# Patient Record
Sex: MALE | Race: WHITE | NOT HISPANIC OR LATINO | URBAN - METROPOLITAN AREA
[De-identification: names, ages, dates, MRNs, and addresses within clinical notes are randomized per-mention and may not be internally consistent; named-entity substitution may affect disease eponyms.]

---

## 2022-01-22 ENCOUNTER — INPATIENT (INPATIENT)
Facility: HOSPITAL | Age: 37
LOS: 0 days | Discharge: HOME | DRG: 897 | End: 2022-01-23
Attending: INTERNAL MEDICINE | Admitting: INTERNAL MEDICINE
Payer: COMMERCIAL

## 2022-01-22 VITALS
HEART RATE: 139 BPM | WEIGHT: 199.96 LBS | RESPIRATION RATE: 18 BRPM | SYSTOLIC BLOOD PRESSURE: 177 MMHG | TEMPERATURE: 98 F | OXYGEN SATURATION: 98 % | DIASTOLIC BLOOD PRESSURE: 125 MMHG | HEIGHT: 73 IN

## 2022-01-22 DIAGNOSIS — R07.89 OTHER CHEST PAIN: ICD-10-CM

## 2022-01-22 DIAGNOSIS — I10 ESSENTIAL (PRIMARY) HYPERTENSION: ICD-10-CM

## 2022-01-22 DIAGNOSIS — F19.90 OTHER PSYCHOACTIVE SUBSTANCE USE, UNSPECIFIED, UNCOMPLICATED: ICD-10-CM

## 2022-01-22 DIAGNOSIS — R00.2 PALPITATIONS: ICD-10-CM

## 2022-01-22 LAB
ALBUMIN SERPL ELPH-MCNC: 4.4 G/DL — SIGNIFICANT CHANGE UP (ref 3.3–5)
ALBUMIN SERPL ELPH-MCNC: 5 G/DL — SIGNIFICANT CHANGE UP (ref 3.3–5)
ALP SERPL-CCNC: 29 U/L — LOW (ref 40–120)
ALP SERPL-CCNC: 32 U/L — LOW (ref 40–120)
ALT FLD-CCNC: 45 U/L — SIGNIFICANT CHANGE UP (ref 10–45)
ALT FLD-CCNC: 48 U/L — HIGH (ref 10–45)
ANION GAP SERPL CALC-SCNC: 10 MMOL/L — SIGNIFICANT CHANGE UP (ref 5–17)
ANION GAP SERPL CALC-SCNC: 15 MMOL/L — SIGNIFICANT CHANGE UP (ref 5–17)
APTT BLD: 32.7 SEC — SIGNIFICANT CHANGE UP (ref 27.5–35.5)
AST SERPL-CCNC: 24 U/L — SIGNIFICANT CHANGE UP (ref 10–40)
AST SERPL-CCNC: 29 U/L — SIGNIFICANT CHANGE UP (ref 10–40)
BASOPHILS # BLD AUTO: 0.04 K/UL — SIGNIFICANT CHANGE UP (ref 0–0.2)
BASOPHILS NFR BLD AUTO: 0.5 % — SIGNIFICANT CHANGE UP (ref 0–2)
BILIRUB SERPL-MCNC: 0.2 MG/DL — SIGNIFICANT CHANGE UP (ref 0.2–1.2)
BILIRUB SERPL-MCNC: 0.2 MG/DL — SIGNIFICANT CHANGE UP (ref 0.2–1.2)
BLD GP AB SCN SERPL QL: NEGATIVE — SIGNIFICANT CHANGE UP
BUN SERPL-MCNC: 16 MG/DL — SIGNIFICANT CHANGE UP (ref 7–23)
BUN SERPL-MCNC: 16 MG/DL — SIGNIFICANT CHANGE UP (ref 7–23)
CALCIUM SERPL-MCNC: 8.8 MG/DL — SIGNIFICANT CHANGE UP (ref 8.4–10.5)
CALCIUM SERPL-MCNC: 9.1 MG/DL — SIGNIFICANT CHANGE UP (ref 8.4–10.5)
CHLORIDE SERPL-SCNC: 103 MMOL/L — SIGNIFICANT CHANGE UP (ref 96–108)
CHLORIDE SERPL-SCNC: 104 MMOL/L — SIGNIFICANT CHANGE UP (ref 96–108)
CK MB CFR SERPL CALC: 1.7 NG/ML — SIGNIFICANT CHANGE UP (ref 0–6.7)
CK SERPL-CCNC: 164 U/L — SIGNIFICANT CHANGE UP (ref 30–200)
CO2 SERPL-SCNC: 24 MMOL/L — SIGNIFICANT CHANGE UP (ref 22–31)
CO2 SERPL-SCNC: 27 MMOL/L — SIGNIFICANT CHANGE UP (ref 22–31)
CREAT SERPL-MCNC: 1.06 MG/DL — SIGNIFICANT CHANGE UP (ref 0.5–1.3)
CREAT SERPL-MCNC: 1.08 MG/DL — SIGNIFICANT CHANGE UP (ref 0.5–1.3)
EOSINOPHIL # BLD AUTO: 0.14 K/UL — SIGNIFICANT CHANGE UP (ref 0–0.5)
EOSINOPHIL NFR BLD AUTO: 1.9 % — SIGNIFICANT CHANGE UP (ref 0–6)
GLUCOSE SERPL-MCNC: 171 MG/DL — HIGH (ref 70–99)
GLUCOSE SERPL-MCNC: 182 MG/DL — HIGH (ref 70–99)
HCT VFR BLD CALC: 44.6 % — SIGNIFICANT CHANGE UP (ref 39–50)
HGB BLD-MCNC: 15.6 G/DL — SIGNIFICANT CHANGE UP (ref 13–17)
IMM GRANULOCYTES NFR BLD AUTO: 0.7 % — SIGNIFICANT CHANGE UP (ref 0–1.5)
INR BLD: 0.94 — SIGNIFICANT CHANGE UP (ref 0.88–1.16)
LYMPHOCYTES # BLD AUTO: 3.9 K/UL — HIGH (ref 1–3.3)
LYMPHOCYTES # BLD AUTO: 51.7 % — HIGH (ref 13–44)
MCHC RBC-ENTMCNC: 30.5 PG — SIGNIFICANT CHANGE UP (ref 27–34)
MCHC RBC-ENTMCNC: 35 GM/DL — SIGNIFICANT CHANGE UP (ref 32–36)
MCV RBC AUTO: 87.3 FL — SIGNIFICANT CHANGE UP (ref 80–100)
MONOCYTES # BLD AUTO: 0.55 K/UL — SIGNIFICANT CHANGE UP (ref 0–0.9)
MONOCYTES NFR BLD AUTO: 7.3 % — SIGNIFICANT CHANGE UP (ref 2–14)
NEUTROPHILS # BLD AUTO: 2.87 K/UL — SIGNIFICANT CHANGE UP (ref 1.8–7.4)
NEUTROPHILS NFR BLD AUTO: 37.9 % — LOW (ref 43–77)
NRBC # BLD: 0 /100 WBCS — SIGNIFICANT CHANGE UP (ref 0–0)
NT-PROBNP SERPL-SCNC: 10 PG/ML — SIGNIFICANT CHANGE UP (ref 0–300)
PLATELET # BLD AUTO: 254 K/UL — SIGNIFICANT CHANGE UP (ref 150–400)
POTASSIUM SERPL-MCNC: 4.3 MMOL/L — SIGNIFICANT CHANGE UP (ref 3.5–5.3)
POTASSIUM SERPL-MCNC: SIGNIFICANT CHANGE UP (ref 3.5–5.3)
POTASSIUM SERPL-SCNC: 4.3 MMOL/L — SIGNIFICANT CHANGE UP (ref 3.5–5.3)
POTASSIUM SERPL-SCNC: SIGNIFICANT CHANGE UP (ref 3.5–5.3)
PROT SERPL-MCNC: 6.4 G/DL — SIGNIFICANT CHANGE UP (ref 6–8.3)
PROT SERPL-MCNC: 7.4 G/DL — SIGNIFICANT CHANGE UP (ref 6–8.3)
PROTHROM AB SERPL-ACNC: 11.3 SEC — SIGNIFICANT CHANGE UP (ref 10.6–13.6)
RBC # BLD: 5.11 M/UL — SIGNIFICANT CHANGE UP (ref 4.2–5.8)
RBC # FLD: 12.5 % — SIGNIFICANT CHANGE UP (ref 10.3–14.5)
RH IG SCN BLD-IMP: NEGATIVE — SIGNIFICANT CHANGE UP
SARS-COV-2 RNA SPEC QL NAA+PROBE: NEGATIVE — SIGNIFICANT CHANGE UP
SODIUM SERPL-SCNC: 141 MMOL/L — SIGNIFICANT CHANGE UP (ref 135–145)
SODIUM SERPL-SCNC: 142 MMOL/L — SIGNIFICANT CHANGE UP (ref 135–145)
TROPONIN T SERPL-MCNC: 0.01 NG/ML — SIGNIFICANT CHANGE UP (ref 0–0.01)
WBC # BLD: 7.55 K/UL — SIGNIFICANT CHANGE UP (ref 3.8–10.5)
WBC # FLD AUTO: 7.55 K/UL — SIGNIFICANT CHANGE UP (ref 3.8–10.5)

## 2022-01-22 PROCEDURE — 93010 ELECTROCARDIOGRAM REPORT: CPT

## 2022-01-22 PROCEDURE — 71275 CT ANGIOGRAPHY CHEST: CPT | Mod: 26,MC

## 2022-01-22 PROCEDURE — 99285 EMERGENCY DEPT VISIT HI MDM: CPT

## 2022-01-22 RX ORDER — FLUTICASONE PROPIONATE 50 MCG
1 SPRAY, SUSPENSION NASAL
Qty: 0 | Refills: 0 | DISCHARGE

## 2022-01-22 RX ORDER — ENOXAPARIN SODIUM 100 MG/ML
40 INJECTION SUBCUTANEOUS EVERY 24 HOURS
Refills: 0 | Status: DISCONTINUED | OUTPATIENT
Start: 2022-01-23 | End: 2022-01-23

## 2022-01-22 RX ORDER — METOPROLOL TARTRATE 50 MG
5 TABLET ORAL ONCE
Refills: 0 | Status: COMPLETED | OUTPATIENT
Start: 2022-01-22 | End: 2022-01-22

## 2022-01-22 RX ORDER — FLUTICASONE PROPIONATE 50 MCG
1 SPRAY, SUSPENSION NASAL
Refills: 0 | Status: DISCONTINUED | OUTPATIENT
Start: 2022-01-22 | End: 2022-01-23

## 2022-01-22 RX ORDER — ASPIRIN/CALCIUM CARB/MAGNESIUM 324 MG
81 TABLET ORAL DAILY
Refills: 0 | Status: DISCONTINUED | OUTPATIENT
Start: 2022-01-22 | End: 2022-01-23

## 2022-01-22 RX ORDER — SODIUM CHLORIDE 9 MG/ML
1000 INJECTION INTRAMUSCULAR; INTRAVENOUS; SUBCUTANEOUS ONCE
Refills: 0 | Status: COMPLETED | OUTPATIENT
Start: 2022-01-22 | End: 2022-01-22

## 2022-01-22 RX ADMIN — Medication 5 MILLIGRAM(S): at 20:30

## 2022-01-22 RX ADMIN — SODIUM CHLORIDE 1000 MILLILITER(S): 9 INJECTION INTRAMUSCULAR; INTRAVENOUS; SUBCUTANEOUS at 20:19

## 2022-01-22 RX ADMIN — Medication 81 MILLIGRAM(S): at 23:34

## 2022-01-22 NOTE — ED ADULT TRIAGE NOTE - AS HEIGHT TYPE
Mildly to Moderately Impaired: difficulty hearing in some environments or speaker may need to increase volume or speak distinctly stated

## 2022-01-22 NOTE — H&P ADULT - NSHPSOCIALHISTORY_GEN_ALL_CORE
Tobacco: denies  ETOH: socially  Illicit drugs: admits to THC edibles Tobacco/ETOH: denies  Illicit drugs: admits to THC edibles

## 2022-01-22 NOTE — ED PROVIDER NOTE - CARE PLAN
1 Principal Discharge DX:	Chest pain  Secondary Diagnosis:	Palpitations  Secondary Diagnosis:	Tachycardia

## 2022-01-22 NOTE — ED PROVIDER NOTE - OBJECTIVE STATEMENT
37 y/o M with no pertinent PMHX presents to the ED with a cc of cp described as pressure associated with palpitation starting around 6pm this evening. Pt has not had any cp or pressure like this previously. Pt has a strong family hx of MI and CAD. Pt younger brother passed away from MI at age 34. Pt grandfather has sustained massive MI at age 50 with a hx of pe in grandfather and father. Pt had recently f/u with his cardiologist and found no findings in a CTA coronary and echo. Pt is AOx3 and in no acute distress in the ED.

## 2022-01-22 NOTE — ED PROVIDER NOTE - PHYSICAL EXAMINATION
VITAL SIGNS: I have reviewed nursing notes and confirm.  CONSTITUTIONAL: Well appearing, in no acute distress.   SKIN:  warm and dry, no acute rash.   HEAD:  normocephalic, atraumatic.  EYES: EOM intact; conjunctiva and sclera clear.  ENT: No nasal discharge; airway clear.   NECK: Supple; non tender.  CARD: S1, S2 normal; no murmurs, gallops, or rubs. Tachycardic and regular rhythm.   RESP:  Clear to auscultation b/l, no wheezes, rales or rhonchi.  ABD: Normal bowel sounds; soft; non-distended; non-tender; no guarding/ rebound.  EXT: Normal ROM. No clubbing, cyanosis or edema. 2+ pulses to b/l ue/le.  NEURO: Alert, oriented, grossly unremarkable  PSYCH: Cooperative, mood and affect appropriate.

## 2022-01-22 NOTE — H&P ADULT - PROBLEM SELECTOR PLAN 2
HR ranging 90s-130s upon admission, improved with Lopressor 5mg IV x 1 dose given in ED.  --CT Angio Chest negative for PE.  --will continue to monitor on telemetry.  --will F/U Utox and consider adding Metoprolol Tartrate 12.5mg PO BID. HR ranging 90s-130s upon admission, improved with Lopressor 5mg IV x 1 dose given in ED.  --CT Angio Chest negative for PE. Will F/U Thyroid studies.  --continue to monitor on telemetry.  --will F/U Utox and consider adding standing Metoprolol Tartrate 12.5mg PO BID.

## 2022-01-22 NOTE — ED PROVIDER NOTE - CLINICAL SUMMARY MEDICAL DECISION MAKING FREE TEXT BOX
35 y/o M with sinus tachycardic, chest pressure and palpitations in the ED. Will check labs, EKG, and obtain CTA chest to rule out PE. Pt given 5l pressor in normal saline Iv and will reassess.

## 2022-01-22 NOTE — H&P ADULT - ASSESSMENT
36 yr old M with strong FHx of CAD, no PMHx who presents to [facility] ED 1/22/22 c/o SSCP and palpitations x few hours after consuming THC edibles, EKG Sinus tachycardia without ischemic changes, CTA Chest negative for PE, admitted to [facility] for cardiac telemetry and further cardiac work-up.

## 2022-01-22 NOTE — ED ADULT NURSE NOTE - OBJECTIVE STATEMENT
Pt presents to ED C/O racing heart, chest discomfort and trouble taking in a deep breath starting today. Pt admits to taking THC at about 4pm and drinking alcohol last night. Pt has family history of MI. States, " I feel anxious, my brother passed away from a heart attack". Pt placed on cardiac monitor. RN continuing to assess.

## 2022-01-22 NOTE — H&P ADULT - PROBLEM SELECTOR PLAN 3
admits to consuming THC gummies PTA.  --will F/U Drug screen.      N: DASH  E: Maintain K>4.0 and Mg >2.0  VTE PPx: Lovenox subcut  Code: Full BP 170s/120s upon arrival to ED, improved to 130s/80s after Lopressor 5mg IV x 1 dose.  --will continue to monitor.

## 2022-01-22 NOTE — H&P ADULT - HISTORY OF PRESENT ILLNESS
36 yr old M with strong FHx of CAD (brother passed@34 from MI, Father/grandfather hx of PE), no PMHx who presents to [facility] ED 1/22/22 c/o chest pressure and palpitations x few hours. Patient reports symptom onset was ~6PM shortly after consuming THC gummies, describes it as nonradiating substernal chest pressure associated with palpitations. Patient denies any N/V, diaphoresis, dizziness, PND, orthopnea, LE edema, recent travel, sick contacts or similar symptoms in the past. Patient states he saw a cardiologist a few months ago and had a reportedly normal Echo and CCTA.    In ED, BP: 133-177/, HR: 130s, RR: 18, Temp: 97.5F, O2 sat: 98% RA. EKG revealed Sinus tachycardia@131BPM without acute ST/T wave changes.  CT Angio Chest negative for PE. Labs notable for: CE negative x 1 set, Drug screen pending, COVID PCR negative.Patient treated with 1 liter IVFs and Lopressor 5mg IV x 1 dose with improvement in HR.     Patient currently stable, admitted to cardiac telemetry for serial cardiac enzymes and further work-up.        36 yr old M with strong FHx of CAD (brother passed@34 from MI, Father/grandfather hx of DVT/PE), no PMHx who presents to [facility] ED 1/22/22 c/o chest pressure and palpitations x few hours. Patient reports symptom onset was ~6PM shortly after consuming THC gummies, describes it as substernal chest pressure/tightness radiating across anterior chest, 7/10 in severity, associated with palpitations. Patient denies any N/V, diaphoresis, dizziness, PND, orthopnea, LE edema, recent travel, sick contacts or similar symptoms in the past. Patient states he saw his cardiologist Dr. Gerald Garza ~6 months ago and had a reportedly normal Echo and Coronary CTA.    In ED, BP: 133-177/, HR: 130s, RR: 18, Temp: 97.5F, O2 sat: 98% RA. EKG revealed Sinus tachycardia@131BPM without acute ST/T wave changes.  CT Angio Chest negative for PE. Labs notable for: CE negative x 1 set, Drug screen pending, COVID PCR negative.Patient treated with 1 liter IVFs and Lopressor 5mg IV x 1 dose with improvement in HR.     Patient currently stable, admitted to cardiac telemetry for serial cardiac enzymes and further work-up.

## 2022-01-22 NOTE — H&P ADULT - PROBLEM SELECTOR PLAN 4
admits to consuming THC gummies PTA.  --will F/U Drug screen.      N: DASH  E: Maintain K>4.0 and Mg >2.0  VTE PPx: Lovenox subcut  Code: Full

## 2022-01-22 NOTE — H&P ADULT - NSICDXFAMILYHX_GEN_ALL_CORE_FT
FAMILY HISTORY:  Father  Still living? Unknown  Family history of pulmonary embolism, Age at diagnosis: Age Unknown    Sibling  Still living? Unknown  Family history of acute myocardial infarction, Age at diagnosis: 31-40    Grandparent  Still living? Unknown  Family history of acute myocardial infarction, Age at diagnosis: 41-50  Family history of pulmonary embolism, Age at diagnosis: Age Unknown

## 2022-01-22 NOTE — H&P ADULT - PROBLEM SELECTOR PLAN 1
currently chest pain free, EKG revealed Sinus tachycardia@131BPM without acute ST/T wave changes.    --CE negative x 1 set, will F/U CE@12:30AM and 5AM.  --obtain Echocardiogram and collateral from outpatient cardiologist regarding recent CT coronaries results.

## 2022-01-23 ENCOUNTER — TRANSCRIPTION ENCOUNTER (OUTPATIENT)
Age: 37
End: 2022-01-23

## 2022-01-23 VITALS — HEART RATE: 88 BPM | SYSTOLIC BLOOD PRESSURE: 144 MMHG | RESPIRATION RATE: 18 BRPM | DIASTOLIC BLOOD PRESSURE: 85 MMHG

## 2022-01-23 LAB
A1C WITH ESTIMATED AVERAGE GLUCOSE RESULT: 5.5 % — SIGNIFICANT CHANGE UP (ref 4–5.6)
AMPHET UR-MCNC: NEGATIVE — SIGNIFICANT CHANGE UP
APPEARANCE UR: CLEAR — SIGNIFICANT CHANGE UP
BARBITURATES UR SCN-MCNC: NEGATIVE — SIGNIFICANT CHANGE UP
BENZODIAZ UR-MCNC: NEGATIVE — SIGNIFICANT CHANGE UP
BILIRUB UR-MCNC: NEGATIVE — SIGNIFICANT CHANGE UP
CK MB CFR SERPL CALC: 1.2 NG/ML — SIGNIFICANT CHANGE UP (ref 0–6.7)
CK MB CFR SERPL CALC: 1.6 NG/ML — SIGNIFICANT CHANGE UP (ref 0–6.7)
CK SERPL-CCNC: 114 U/L — SIGNIFICANT CHANGE UP (ref 30–200)
CK SERPL-CCNC: 134 U/L — SIGNIFICANT CHANGE UP (ref 30–200)
COCAINE METAB.OTHER UR-MCNC: NEGATIVE — SIGNIFICANT CHANGE UP
COLOR SPEC: YELLOW — SIGNIFICANT CHANGE UP
DIFF PNL FLD: NEGATIVE — SIGNIFICANT CHANGE UP
ESTIMATED AVERAGE GLUCOSE: 111 MG/DL — SIGNIFICANT CHANGE UP (ref 68–114)
GLUCOSE UR QL: 250
HCT VFR BLD CALC: 42.9 % — SIGNIFICANT CHANGE UP (ref 39–50)
HGB BLD-MCNC: 14.7 G/DL — SIGNIFICANT CHANGE UP (ref 13–17)
KETONES UR-MCNC: NEGATIVE — SIGNIFICANT CHANGE UP
LEUKOCYTE ESTERASE UR-ACNC: NEGATIVE — SIGNIFICANT CHANGE UP
MCHC RBC-ENTMCNC: 30.2 PG — SIGNIFICANT CHANGE UP (ref 27–34)
MCHC RBC-ENTMCNC: 34.3 GM/DL — SIGNIFICANT CHANGE UP (ref 32–36)
MCV RBC AUTO: 88.3 FL — SIGNIFICANT CHANGE UP (ref 80–100)
METHADONE UR-MCNC: NEGATIVE — SIGNIFICANT CHANGE UP
NITRITE UR-MCNC: NEGATIVE — SIGNIFICANT CHANGE UP
NRBC # BLD: 0 /100 WBCS — SIGNIFICANT CHANGE UP (ref 0–0)
OPIATES UR-MCNC: NEGATIVE — SIGNIFICANT CHANGE UP
PCP SPEC-MCNC: SIGNIFICANT CHANGE UP
PCP UR-MCNC: NEGATIVE — SIGNIFICANT CHANGE UP
PH UR: 6 — SIGNIFICANT CHANGE UP (ref 5–8)
PLATELET # BLD AUTO: 205 K/UL — SIGNIFICANT CHANGE UP (ref 150–400)
PROT UR-MCNC: NEGATIVE MG/DL — SIGNIFICANT CHANGE UP
RBC # BLD: 4.86 M/UL — SIGNIFICANT CHANGE UP (ref 4.2–5.8)
RBC # FLD: 12.7 % — SIGNIFICANT CHANGE UP (ref 10.3–14.5)
SP GR SPEC: 1.02 — SIGNIFICANT CHANGE UP (ref 1–1.03)
T4 AB SER-ACNC: 5.4 UG/DL — SIGNIFICANT CHANGE UP (ref 4.5–11.7)
THC UR QL: POSITIVE
TROPONIN T SERPL-MCNC: 0.01 NG/ML — SIGNIFICANT CHANGE UP (ref 0–0.01)
TROPONIN T SERPL-MCNC: <0.01 NG/ML — SIGNIFICANT CHANGE UP (ref 0–0.01)
TSH SERPL-MCNC: 1.27 UIU/ML — SIGNIFICANT CHANGE UP (ref 0.27–4.2)
UROBILINOGEN FLD QL: 0.2 E.U./DL — SIGNIFICANT CHANGE UP
WBC # BLD: 6.52 K/UL — SIGNIFICANT CHANGE UP (ref 3.8–10.5)
WBC # FLD AUTO: 6.52 K/UL — SIGNIFICANT CHANGE UP (ref 3.8–10.5)

## 2022-01-23 PROCEDURE — 99238 HOSP IP/OBS DSCHRG MGMT 30/<: CPT

## 2022-01-23 PROCEDURE — 86901 BLOOD TYPING SEROLOGIC RH(D): CPT

## 2022-01-23 PROCEDURE — 93005 ELECTROCARDIOGRAM TRACING: CPT

## 2022-01-23 PROCEDURE — 71275 CT ANGIOGRAPHY CHEST: CPT | Mod: MC

## 2022-01-23 PROCEDURE — 82553 CREATINE MB FRACTION: CPT

## 2022-01-23 PROCEDURE — 86850 RBC ANTIBODY SCREEN: CPT

## 2022-01-23 PROCEDURE — 80048 BASIC METABOLIC PNL TOTAL CA: CPT

## 2022-01-23 PROCEDURE — 83036 HEMOGLOBIN GLYCOSYLATED A1C: CPT

## 2022-01-23 PROCEDURE — 82550 ASSAY OF CK (CPK): CPT

## 2022-01-23 PROCEDURE — 99285 EMERGENCY DEPT VISIT HI MDM: CPT

## 2022-01-23 PROCEDURE — 85025 COMPLETE CBC W/AUTO DIFF WBC: CPT

## 2022-01-23 PROCEDURE — 80307 DRUG TEST PRSMV CHEM ANLYZR: CPT

## 2022-01-23 PROCEDURE — 83880 ASSAY OF NATRIURETIC PEPTIDE: CPT

## 2022-01-23 PROCEDURE — 80053 COMPREHEN METABOLIC PANEL: CPT

## 2022-01-23 PROCEDURE — 85730 THROMBOPLASTIN TIME PARTIAL: CPT

## 2022-01-23 PROCEDURE — 85027 COMPLETE CBC AUTOMATED: CPT

## 2022-01-23 PROCEDURE — 84484 ASSAY OF TROPONIN QUANT: CPT

## 2022-01-23 PROCEDURE — 83735 ASSAY OF MAGNESIUM: CPT

## 2022-01-23 PROCEDURE — 80061 LIPID PANEL: CPT

## 2022-01-23 PROCEDURE — 86900 BLOOD TYPING SEROLOGIC ABO: CPT

## 2022-01-23 PROCEDURE — 85610 PROTHROMBIN TIME: CPT

## 2022-01-23 PROCEDURE — 84436 ASSAY OF TOTAL THYROXINE: CPT

## 2022-01-23 PROCEDURE — 96374 THER/PROPH/DIAG INJ IV PUSH: CPT

## 2022-01-23 PROCEDURE — 84443 ASSAY THYROID STIM HORMONE: CPT

## 2022-01-23 PROCEDURE — 87635 SARS-COV-2 COVID-19 AMP PRB: CPT

## 2022-01-23 PROCEDURE — 81003 URINALYSIS AUTO W/O SCOPE: CPT

## 2022-01-23 PROCEDURE — 36415 COLL VENOUS BLD VENIPUNCTURE: CPT

## 2022-01-23 RX ORDER — METOPROLOL TARTRATE 50 MG
12.5 TABLET ORAL
Refills: 0 | Status: DISCONTINUED | OUTPATIENT
Start: 2022-01-23 | End: 2022-01-23

## 2022-01-23 RX ORDER — POTASSIUM CHLORIDE 20 MEQ
20 PACKET (EA) ORAL ONCE
Refills: 0 | Status: COMPLETED | OUTPATIENT
Start: 2022-01-23 | End: 2022-01-23

## 2022-01-23 RX ADMIN — Medication 20 MILLIEQUIVALENT(S): at 09:53

## 2022-01-23 NOTE — DISCHARGE NOTE PROVIDER - HOSPITAL COURSE
36 yr old M with strong FHx of CAD (brother passed@34 from MI, Father/grandfather hx of DVT/PE), no PMHx who presents to [facility] ED 1/22/22 c/o chest pressure and palpitations x few hours. Patient reports symptom onset was ~6PM shortly after consuming THC gummies, describes it as substernal chest pressure/tightness radiating across anterior chest, 7/10 in severity, associated with palpitations. Patient denies any N/V, diaphoresis, dizziness, PND, orthopnea, LE edema, recent travel, sick contacts or similar symptoms in the past. Patient states he saw his cardiologist Dr. Gerald Garza ~6 months ago and had a reportedly normal Echo and Coronary CTA. In ED, BP: 133-177/, HR: 130s, RR: 18, Temp: 97.5F, O2 sat: 98% RA. EKG revealed Sinus tachycardia@131BPM without acute ST/T wave changes.  CT Angio Chest negative for PE. Labs notable for: CE negative x 1 set, Drug screen pending, COVID PCR negative.Patient treated with 1 liter IVFs and Lopressor 5mg IV x 1 dose with improvement in HR. Patient currently stable, admitted to cardiac telemetry for serial cardiac enzymes and further work-up.    While admit, trop neg x3. ECG  1/23/22: NSR @ 82 BPM with incomplete RBBB without ST/T segment changes. CT PE 1/22/22: limited eval of sebmental pulm arteries in the LLL due to cardiac motion artifact; within this limitation, no PE is identified.  Outpatient CCTA performed in 2021 and reviewed by Dr. 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 Utox performed which was positive for THC only.  , HDL 57, total chol 222, HgbA1C 5.5%. 10 year ASCVD risk score 1.2% so statin not initiated. CP likely in setting of THC use.  Hypokalemia to 3.0 which was repleted with KCl 20 mEq x1.     No significant events on telemetry overnight. Patient has been medically cleared for discharge as per Dr. Lou. Patient has been given appropriate discharge instructions including medication regimen and follow up.     Temp 98F, HR 85 BPM, /73, RR 16, SpO2 96% on RA  Gen: NAD, A&O x3  Cards: RRR, clear S1 and S2 without murmur  Pulm: CTA B/L without w/r/r  Abd: soft, NT, BS +x4  Ext: no LE edema or ulcerations B/L     36 yr old M with strong FHx of CAD (brother passed@34 from MI, Father/grandfather hx of DVT/PE), no PMHx who presents to [facility] ED 1/22/22 c/o chest pressure and palpitations x few hours. Patient reports symptom onset was ~6PM shortly after consuming THC gummies, describes it as substernal chest pressure/tightness radiating across anterior chest, 7/10 in severity, associated with palpitations. Patient denies any N/V, diaphoresis, dizziness, PND, orthopnea, LE edema, recent travel, sick contacts or similar symptoms in the past. Patient states he saw his cardiologist Dr. Gerald Garza ~6 months ago and had a reportedly normal Echo and Coronary CTA. In ED, BP: 133-177/, HR: 130s, RR: 18, Temp: 97.5F, O2 sat: 98% RA. EKG revealed Sinus tachycardia@131BPM without acute ST/T wave changes.  CT Angio Chest negative for PE. Labs notable for: CE negative x 1 set, Drug screen pending, COVID PCR negative.Patient treated with 1 liter IVFs and Lopressor 5mg IV x 1 dose with improvement in HR. Patient currently stable, admitted to cardiac telemetry for serial cardiac enzymes and further work-up.    While admit, trop neg x3. ECG  1/23/22: NSR @ 82 BPM with incomplete RBBB without ST/T segment changes. CT PE 1/22/22: limited eval of sebmental pulm arteries in the LLL due to cardiac motion artifact; within this limitation, no PE is identified.  Outpatient CCTA performed in 2021 and reviewed by Dr. 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 Utox performed which was positive for THC only.  , HDL 57, total chol 222, HgbA1C 5.5%. 10 year ASCVD risk score 1.2% so statin not initiated. CP likely in setting of THC use.  Hypokalemia to 3.9 which was repleted with KCl 20 mEq x1.     No significant events on telemetry overnight. Patient has been medically cleared for discharge as per Dr. Lou. Patient has been given appropriate discharge instructions including medication regimen and follow up.     Temp 98F, HR 85 BPM, /73, RR 16, SpO2 96% on RA  Gen: NAD, A&O x3  Cards: RRR, clear S1 and S2 without murmur  Pulm: CTA B/L without w/r/r  Abd: soft, NT, BS +x4  Ext: no LE edema or ulcerations B/L

## 2022-01-23 NOTE — PATIENT PROFILE ADULT - DOES PATIENT HAVE ADVANCE DIRECTIVE
Contact patient's mother to ask additional questions about rash on leg and possible DDx. Patient's mother feels this could be due to a spider bite and says patient usually has \"allergic reaction\" to insect bites where she gets whelps. The current area on her leg looks red and feels warm in one area but then looks \"bruised\" in surrounding area. Patient's mother has started applying triamcinolone steroid cream but they had not started benadryl yet. Patient's mother instructed to start benadryl 25 mg every 6 hours for possible allergic reaction to insect bite and continue triamcinolone twice daily to area. Patient's mother also instructed to outline the red area and also the surrounding area that looks bruised so that she can see if redness/reaction/rash are spreading. Mother voiced understanding of plan of care and agrees. Over 50% of the total visit time of 15 minutes was spent on counseling and/or coordination of care of:   1.  Allergic reaction to insect bite Yes

## 2022-01-23 NOTE — DISCHARGE NOTE NURSING/CASE MANAGEMENT/SOCIAL WORK - PATIENT PORTAL LINK FT
I've moved the patient's appointment to the next available opening - August 7th at 8:30 AM
You can access the FollowMyHealth Patient Portal offered by [facility] by registering at the following website: [email or web address]. By joining [facility]’s FollowMyHealth portal, you will also be able to view your health information using other applications (apps) compatible with our system.

## 2022-01-23 NOTE — DISCHARGE NOTE PROVIDER - CARE PROVIDER_API CALL
POWER KOLB  Internal Medicine  [address] [place] [address]  Phone: [phone]  Fax: [phone]  Follow Up Time:

## 2022-01-23 NOTE — PATIENT PROFILE ADULT - FALL HARM RISK - UNIVERSAL INTERVENTIONS
Bed in lowest position, wheels locked, appropriate side rails in place/Call bell, personal items and telephone in reach/Instruct patient to call for assistance before getting out of bed or chair/Non-slip footwear when patient is out of bed/Grant to call system/Physically safe environment - no spills, clutter or unnecessary equipment/Purposeful Proactive Rounding/Room/bathroom lighting operational, light cord in reach

## 2022-01-23 NOTE — DISCHARGE NOTE PROVIDER - NSDCCPCAREPLAN_GEN_ALL_CORE_FT
PRINCIPAL DISCHARGE DIAGNOSIS  Diagnosis: Chest pain  Assessment and Plan of Treatment: Your EKG was normal. Your outpatient CT of your heart was reviewed and was unremarkable. You had a CT of your lungs that did not reveal any pulmonary embolism (blood clot of your lungs). Your troponin (heart enzyme that leaks into the blood when your heart is damaged) was negative x3.  Your chest pain was likely in the setting of THC use.  Please follow up with your outpatient Cardiologist Dr. Gerald Garza in 1 week.      SECONDARY DISCHARGE DIAGNOSES  Diagnosis: Palpitations  Assessment and Plan of Treatment:     Diagnosis: Tachycardia  Assessment and Plan of Treatment:      PRINCIPAL DISCHARGE DIAGNOSIS  Diagnosis: Chest pain  Assessment and Plan of Treatment: Your EKG was normal. Your outpatient CT of your heart was reviewed and was unremarkable. You had a CT of your lungs that did not reveal any pulmonary embolism (blood clot of your lungs). Your troponin (heart enzyme that leaks into the blood when your heart is damaged) was negative x3.  Your chest pain/palpitations were likely in the setting of THC use so it would be benefical that you stop.  Please follow up with your outpatient Cardiologist Dr. Gerald Garza in 1 week.

## 2022-01-27 DIAGNOSIS — F12.99 CANNABIS USE, UNSPECIFIED WITH UNSPECIFIED CANNABIS-INDUCED DISORDER: ICD-10-CM

## 2022-01-27 DIAGNOSIS — Z82.49 FAMILY HISTORY OF ISCHEMIC HEART DISEASE AND OTHER DISEASES OF THE CIRCULATORY SYSTEM: ICD-10-CM

## 2022-01-27 DIAGNOSIS — R00.2 PALPITATIONS: ICD-10-CM

## 2022-01-27 DIAGNOSIS — R00.0 TACHYCARDIA, UNSPECIFIED: ICD-10-CM

## 2022-01-27 DIAGNOSIS — I10 ESSENTIAL (PRIMARY) HYPERTENSION: ICD-10-CM

## 2023-05-05 NOTE — PATIENT PROFILE ADULT - MONEY FOR FOOD
----- Message from Tommy Dixon MD sent at 5/4/2023  9:24 PM CDT -----  Labs ok    
Pt informed of lab results, per Dr. Zack hartman ok, pt verbalized understanding.  
no

## 2024-01-18 NOTE — ED ADULT TRIAGE NOTE - WEIGHT IN LBS
Attempted to call patient, no answer. Left message reminding of labs order and labs due to be drawn. Reminded to get labs drawn at a [facility] lab. Left number to call back with questions.   [phone]

## 2025-03-20 NOTE — PATIENT PROFILE ADULT - IS THERE A SUSPICION OF ABUSE/NEGLIGENCE?
Need appointment if they have further questions with the lab results. Can be virtual.     Nayely Ayers MD     no

## 2025-06-20 NOTE — ED ADULT TRIAGE NOTE - SOURCE OF INFORMATION
Ambulatory Care Coordination Note     2025 11:09 AM     Patient Current Location:  Home: [id number]  [facility] [facility] [id number]     ACM contacted the patient by telephone. Verified name and  with patient as identifiers.         ACM: Isabel Sevilla RN     Challenges to be reviewed by the provider   Additional needs identified to be addressed with provider No                  Method of communication with provider: none.    Utilization: Patient has not had any utilization since our last call.     Care Summary Note:        ACM outreach for graduation call. ACM spoke to Brandy brantley's wife. She reports everything is going well. With no utiliztion reported. Pt has ortho follow up  and starts PT shortly after. Pt and Brandy agree to close CC case at this time.     Offered patient enrollment in the Remote Patient Monitoring (RPM) program for in-home monitoring: Yes, but did not enroll at this time: already monitoring with home equipment.     Assessments Completed:   Diabetes Assessment    Medic Alert ID: No  Meal Planning: Avoidance of concentrated sweets   How often do you test your blood sugar?: Daily   Do you have barriers with adherence to non-pharmacologic self-management interventions? (Nutrition/Exercise/Self-Monitoring): No   Have you ever had to go to the ED for symptoms of low blood sugar?: No       No patient-reported symptoms   Do you have hyperglycemia symptoms?: No   Do you have hypoglycemia symptoms?: No   Last Blood Sugar Value: 120   Blood Sugar Monitoring Regimen: Morning Fasting, Before Meals, At Bedtime   Blood Sugar Trends: Fluctuating             Medications Reviewed:   Patient denies any changes with medications and reports taking all medications as prescribed.    Advance Care Planning:   Not reviewed during this call     Care Planning:    Goals Addressed                   This Visit's Progress     Self Monitoring   On track     Daily Weights - I will weight myself as directed - Daily and write  Patient